# Patient Record
Sex: FEMALE | Race: BLACK OR AFRICAN AMERICAN | NOT HISPANIC OR LATINO | Employment: OTHER | URBAN - METROPOLITAN AREA
[De-identification: names, ages, dates, MRNs, and addresses within clinical notes are randomized per-mention and may not be internally consistent; named-entity substitution may affect disease eponyms.]

---

## 2019-01-27 PROBLEM — R07.9 CHEST PAIN: Status: ACTIVE | Noted: 2019-01-27

## 2019-01-27 PROBLEM — I10 HTN (HYPERTENSION), BENIGN: Status: ACTIVE | Noted: 2019-01-27

## 2019-01-27 PROBLEM — E11.65 UNCONTROLLED TYPE 2 DIABETES MELLITUS WITH HYPERGLYCEMIA: Status: ACTIVE | Noted: 2019-01-27

## 2019-01-28 PROBLEM — E78.5 HYPERLIPIDEMIA: Status: ACTIVE | Noted: 2019-01-28

## 2022-06-10 ENCOUNTER — NURSE TRIAGE (OUTPATIENT)
Dept: ADMINISTRATIVE | Facility: CLINIC | Age: 52
End: 2022-06-10
Payer: MEDICARE

## 2022-06-10 NOTE — TELEPHONE ENCOUNTER
C/o R side headache since yesterday, describes as shooting pain & pressure to R side & R eye, intermittently every few minutes. Pt aaox3. Speaking in full clear sentences, able to answers questions appropriately. +generalized weakness. +visual disturbances. Took BP med & tylenol shortly before call. Has not assessed BP this morning.    Dispo-UC/ER or office with approval now. Advised if unable to schedule or reach outside PCP within 30-40 mins and/or symptoms change worsen, to f/u in UC/ER at that time, also to have someone else transport. Pt vu.         Reason for Disposition   Loss of vision or double vision (Exception: same as prior migraines)    Additional Information   Negative: Difficult to awaken or acting confused (e.g., disoriented, slurred speech)   Negative: Weakness of the face, arm or leg on one side of the body and new-onset   Negative: Numbness of the face, arm or leg on one side of the body and new-onset   Negative: Loss of speech or garbled speech and new-onset   Negative: Passed out (i.e., fainted, collapsed and was not responding)   Negative: Sounds like a life-threatening emergency to the triager   Negative: Unable to walk without falling   Negative: Stiff neck (can't touch chin to chest)   Negative: Possibility of carbon monoxide exposure   Negative: SEVERE headache, states 'worst headache' of life   Negative: SEVERE headache, sudden-onset (i.e., reaching maximum intensity within seconds to 1 hour)   Negative: Severe pain in one eye    Protocols used: HEADACHE-A-OH

## 2025-05-24 ENCOUNTER — TELEPHONE (OUTPATIENT)
Facility: OTHER | Age: 55
End: 2025-05-24
Payer: MEDICARE

## 2025-05-24 ENCOUNTER — NURSE TRIAGE (OUTPATIENT)
Dept: ADMINISTRATIVE | Facility: CLINIC | Age: 55
End: 2025-05-24
Payer: MEDICARE

## 2025-05-24 ENCOUNTER — PATIENT OUTREACH (OUTPATIENT)
Facility: OTHER | Age: 55
End: 2025-05-24
Payer: MEDICARE

## 2025-05-24 ENCOUNTER — OCHSNER VIRTUAL EMERGENCY DEPARTMENT (OUTPATIENT)
Facility: CLINIC | Age: 55
End: 2025-05-24
Payer: MEDICARE

## 2025-05-24 NOTE — PLAN OF CARE-OVED
[facility] Emergency Department Plan of Care Note  Referral Source: Nurse On-Call                               Chief Complaint   Patient presents with    Chest Pain     Chest pain, shortness of breath, lightheadedness, elevated BP     Seen yesterday for same, treated as hypertensive urgency    Recommendation: Emergency Department

## 2025-05-24 NOTE — PROGRESS NOTES
Patient responded to an automated text from [facility] indicating a need for additional assistance following an Emergency Department Visit. The patient was seen in the ED on 5/23/25 for hypertensive urgency. Patient states her BP this morning and it was 153/99 and took the medication. She checked her BP 2 hours later and it is 175/114. She is experiencing lightheadedness, SOB with exertion, and chest tightness.    Harry Provider Dr Rg Chris disposition recommendation patient to go to ED.    Follow up scheduled for 5/25/25 to assess for additional needs.       SALBADOR kirkland LEXII al 13-VAY lent CHALINO leeann gate VAX een  Brand:  Prevnar 13  What is the most important information I should know about this vaccine? For children, the pneumococcal 13-valent vaccine is given in a series of shots. The first shot is usually given when the child is 3 months old. The booster shots are then given at 4 months, 6 months, and 15to 13months of age. Adults usually receive only one dose of the vaccine. In a child older than 6 months who has not yet received this vaccine, the first dose can be given any time from the age of 10 months through 11 years (before the 7th birthday). If the child is less than 3year old at the time of the first shot, he or she will need 2 booster doses. If the child is 15 to 22 months old at the time of the first shot, he or she will need 1 booster dose. A child who is 2 years or older at the time of the first shot may need only the one shot and no booster doses. The timing of this vaccination is very important for it to be effective. Your child's individual booster schedule may be different from these guidelines. Follow your doctor's instructions or the schedule recommended by the health department of the state you live in. Keep track of any and all side effects your child has after receiving this vaccine. When the child receives a booster dose, you will need to tell the doctor if the previous shot caused any side effects. You can still receive a vaccine if you have a minor cold. In the case of a more severe illness with a fever or any type of infection, wait until you get better before receiving this vaccine. Becoming infected with pneumococcal disease (such as pneumonia or meningitis) is much more dangerous to your health than receiving this vaccine. However, like any medicine, this vaccine can cause side effects but the risk of serious side effects is extremely low.   Be sure to keep your child on a regular schedule for other immunizations against diseases such as diphtheria, tetanus, pertussis (whooping cough), measles, mumps, hepatitis, or varicella (chicken pox). Your doctor or state health department can provide you with a recommended immunization schedule. What is pneumococcal 13-valent conjugate vaccine? Pneumococcal disease is a serious infection caused by a bacteria. Pneumococcal bacteria can infect the sinuses and inner ear. It can also infect the lungs, blood, and brain, and these conditions can be fatal.  Pneumococcal 13-valent vaccine is used to prevent infection caused by pneumococcal bacteria. This vaccine contains 13 different types of pneumococcal bacteria. Pneumococcal 13-valent vaccine works by exposing you to a small amount of the bacteria or a protein from the bacteria, which causes the body to develop immunity to the disease. This vaccine will not treat an active infection that has already developed in the body. Pneumococcal 13-valent vaccine is for use in children from 6 weeks to 11years old, and in adults who are 48 and older. Becoming infected with pneumococcal disease (such as pneumonia or meningitis) is much more dangerous to your health than receiving this vaccine. However, like any medicine, this vaccine can cause side effects but the risk of serious side effects is extremely low. Like any vaccine, pneumococcal 13-valent vaccine may not provide protection from disease in every person. What should I discuss with my healthcare provider before receiving this vaccine? Keep track of any and all side effects your child has after receiving this vaccine. When the child receives a booster dose, you will need to tell the doctor if the previous shot caused any side effects. You should not receive this vaccine if you ever had a severe allergic reaction to a pneumococcal or diphtheria vaccine. Before your child receives this vaccine, tell your doctor if the child was born prematurely.   To make sure you or your child can safely receive this vaccine, tell your doctor if you or your child have any of these other conditions:  · a bleeding or blood clotting disorder such as hemophilia or easy bruising; or  · a weak immune system caused by disease, bone marrow transplant, or by using certain medicines or receiving cancer treatments. You can still receive a vaccine if you have a minor cold. In the case of a more severe illness with a fever or any type of infection, wait until you get better before receiving this vaccine. How is this vaccine given? This vaccine is injected into a muscle. You will receive this injection in a doctor's office or clinic setting. For children, the pneumococcal 13-valent vaccine is given in a series of shots. The first shot is usually given when the child is 3 months old. The booster shots are then given at 4 months, 6 months, and 15to 13months of age. Adults usually receive only one dose of the vaccine. The first injection should be given no earlier than 10weeks of age. Allow at least 2 months to pass between injections. If your child is older than 6 months, he or she can still receive this vaccine on the following schedule:  · Age 7-11 months: two injections at least 4 weeks apart, followed by a third injection after the child turns 1 year (at least 2 months after the second injection); · Age 12-23 months: two injections at least 2 months apart;  · Age 19 months to 5 years (before the 7th birthday): one injection. The timing of this vaccination is very important for it to be effective. Your child's individual booster schedule may be different from these guidelines. Follow your doctor's instructions or the schedule recommended by the health department of the state you live in. Your doctor may recommend treating fever and pain with an aspirin-free pain reliever such as acetaminophen (Tylenol) or ibuprofen (Motrin, Advil, and others) when the shot is given and for the next 24 hours. Follow the label directions or your doctor's instructions about how much of this medicine to give your child. It is especially important to prevent fever from occurring in a child who has a seizure disorder such as epilepsy. Be sure to keep your child on a regular schedule for other immunizations such as diphtheria, tetanus, pertussis (whooping cough), hepatitis, and varicella (chicken pox). Your doctor or state health department can provide you with a recommended immunization schedule. What happens if I miss a dose? Contact your doctor if your child will miss a booster dose or gets behind schedule. The next dose should be given as soon as possible. There is no need to start over. Be sure your child receives all recommended doses of this vaccine. If your child does not receive the full series of vaccines, he or she may not be fully protected against the disease. What happens if I overdose? An overdose of this vaccine is unlikely to occur. What should I avoid before or after receiving this vaccine? Follow your doctor's instructions about any restrictions on food, beverages, or activity. What are the possible side effects of this vaccine? Your child should not receive a booster vaccine if he or she had a life-threatening allergic reaction after the first shot. Keep track of any and all side effects your child has after receiving this vaccine. When the child receives a booster dose, you will need to tell the doctor if the previous shot caused any side effects. Get emergency medical help if your child has any of these signs of an allergic reaction: hives; difficulty breathing; swelling of the face, lips, tongue, or throat.   Call your doctor at once if you or your child has a serious side effect such as:  · high fever (103 degrees or higher);  · seizure (convulsions);  · wheezing, trouble breathing;  · severe stomach pain, severe vomiting or diarrhea;  · easy bruising or bleeding; or  · severe pain, itching, irritation, or skin changes where the shot was given. Less serious side effects include  · crying, fussiness;  · headache, tired feeling;  · muscle or joint pain;  · drowsiness, sleeping more or less than usual;  · mild redness, swelling, tenderness, or a hard lump where the shot was given;  · loss of appetite, mild vomiting or diarrhea;  · low fever (102 degrees or less), chills; or  · mild skin rash. This is not a complete list of side effects and others may occur. Call your doctor for medical advice about side effects. You may report vaccine side effects to the [address] [place] [address] [address] and Human Services at 4-106.125.2155. [facility] information has been compiled for use by healthcare practitioners and consumers in the [place] and therefore [facility] does not warrant that uses outside of the [place] are appropriate, unless specifically indicated otherwise. [facility]'s drug information does not endorse drugs, diagnose patients or recommend therapy. [facility]'s drug information is an informational resource designed to assist licensed healthcare practitioners in caring for their patients and/or to serve consumers viewing this service as a supplement to, and not a substitute for, the expertise, skill, knowledge and judgment of healthcare practitioners. The absence of a warning for a given drug or drug combination in no way should be construed to indicate that the drug or drug combination is safe, effective or appropriate for any given patient. [facility] does not assume any responsibility for any aspect of healthcare administered with the aid of information [facility] [place] [address] and Human Services at 0-641.775.9813. [facility] and therefore [facility] does not warrant that uses outside of the [facility] are appropriate, unless specifically indicated otherwise. [facility] drug information does not endorse drugs, diagnose patients or recommend therapy. [facility]'s drug information is an informational resource designed to assist licensed healthcare practitioners in caring for their patients and/or to serve consumers viewing this service as a supplement to, and not a substitute for, the expertise, skill, knowledge and judgment of healthcare practitioners. The absence of a warning for a given drug or drug combination in no way should be construed to indicate that the drug or drug combination is safe, effective or appropriate for any given patient. [facility] does not assume any responsibility for any aspect of healthcare administered with the aid of information [facility] [facility] (Santa Rosa melvin, fear, and surprise.  Your baby may be much more social and may laugh and smile at other people. At this age, your baby may be ready to roll over and hold on to toys. They may , smile, laugh, and squeal. By the third or fourth month, many babies can sleep up to 7 or 8 hours during the night and develop set nap times. Follow-up care is a key part of your child's treatment and safety. Be sure to make and go to all appointments, and call your doctor if your child is having problems. It's also a good idea to know your child's test results and keep a list of the medicines your child takes. How can you care for your child at home? Feeding  · If you breastfeed, let your baby decide when and how long to nurse. · If you do not breastfeed, use a formula with iron. · Do not give your baby honey in the first year of life. Honey can make your baby sick. · You may begin to give solid foods when your baby is about 10 months old. Some babies may be ready for solid foods at 4 or 5 months. Ask your doctor when you can start feeding your baby solid foods. At first, give foods that are smooth, easy to digest, and part fluid, such as rice cereal.  · Use a baby spoon or a small spoon to feed your baby. Begin with one or two teaspoons of cereal mixed with breast milk or lukewarm formula. Your baby's stools will become firmer after starting solid foods. · Keep feeding breast milk or formula while your baby starts eating solid foods. Parenting  · Read books to your baby daily. · If your baby is teething, it may help to gently rub the gums or use teething rings. · Put your baby on their stomach when awake to help strengthen the neck and arms. · Give your baby brightly colored toys to hold and look at. Immunizations  · Most babies get the second dose of important vaccines at their 4-month checkup. Make sure that your baby gets the recommended childhood vaccines for illnesses, such as whooping cough and diphtheria.  These vaccines will help keep your baby healthy and prevent the spread of disease. Your baby needs all doses to be protected. When should you call for help? Watch closely for changes in your child's health, and be sure to contact your doctor if:    · You are concerned that your child is not growing or developing normally.     · You are worried about your child's behavior.     · You need more information about how to care for your child, or you have questions or concerns. Where can you learn more? Go to https://[employer]. org and sign in to your [employer] account. Enter  in the [employer] [facility] ([facility]). 

## 2025-05-24 NOTE — TELEPHONE ENCOUNTER
Call taken from [employer].    Maday c/o high blood pressure, severe headache unrelieved by Tylenol and severe left eye pain. Symptoms are new since ED visit today. Current /107. Pt reports went to ED yesterday and today for high blood pressure. Pt also reports intermittent episodes of SOB but states SOB is not present now. Advised Maday per triage protocol to go to nearest ED now for physician [place]. Instructed to call  now if no immediate . V/u.     Reason for Disposition   Severe pain in one eye    Additional Information   Negative: Difficult to awaken or acting confused (e.g., disoriented, slurred speech)   Negative: SEVERE difficulty breathing (e.g., struggling for each breath, speaks in single words)   Negative: [1] Weakness of the face, arm or leg on one side of the body AND [2] new-onset   Negative: [1] Numbness (i.e., loss of sensation) of the face, arm or leg on one side of the body AND [2] new-onset   Negative: [1] Chest pain lasts > 5 minutes AND [2] history of heart disease (i.e., heart attack, bypass surgery, angina, angioplasty, CHF)   Negative: [1] Chest pain AND [2] took nitroglycerin AND [3] pain was not relieved   Negative: Sounds like a life-threatening emergency to the triager   Symptom is main concern (e.g., headache, chest pain)   Negative: Difficult to awaken or acting confused (e.g., disoriented, slurred speech)   Negative: [1] Weakness of the face, arm or leg on one side of the body AND [2] new-onset   Negative: [1] Numbness of the face, arm or leg on one side of the body AND [2] new-onset   Negative: [1] Loss of speech or garbled speech AND [2] new-onset   Negative: Passed out (e.g., fainted, lost consciousness, blacked out and was not responding)   Negative: Sounds like a life-threatening emergency to the triager   Negative: Followed a head injury   Negative: Unable to walk, or can only walk with assistance (e.g., requires support)   Negative: Stiff neck (can't touch  chin to chest)    Protocols used: Blood Pressure - High-A-AH, Headache-A-AH

## 2025-05-24 NOTE — PROGRESS NOTES
Patient responded to an automated text from [facility] indicating a need for additional assistance following an Emergency Department Visit. The patient was seen in the ED on 5/23/25 for hypertension. The ED AVS instructs the patient to follow up with PCP. Contacted patient to assess for needs. Patient states that was prescribed metoprolol 50mg. She checked her BP this morning and it was 153/99 and took the medication. She checked her BP 2 hours later and it is 175/114. Today she has eaten crackers and smoked sausage with mustard. She drank 2 bottles of water today. She also states that she is having lightheadedness, mild chest discomfort, and SOB with exertion. Call escalated to [facility]. Dr Rg Chris disposition is for patient to return to the ED. Patient agrees with plan and disconnected the call.   Patient provided with [facility] 24/7 call line phone number [phone]. Encounter closed.

## 2025-05-26 ENCOUNTER — PATIENT OUTREACH (OUTPATIENT)
Facility: OTHER | Age: 55
End: 2025-05-26
Payer: MEDICARE

## 2025-05-26 NOTE — PROGRESS NOTES
Patient was seen in the Emergency Department on 5/24/25. The After Visit Summary instructed patient to follow up with Cardiology. A follow-up call was made to assess for additional needs. Patient stated she is currently in the Emergency Department at [facility], still experiencing elevated blood pressure and chest pain. Patient was advised that she would receive a follow-up call in a couple of days to assess for needs.

## 2025-06-01 ENCOUNTER — PATIENT OUTREACH (OUTPATIENT)
Facility: OTHER | Age: 55
End: 2025-06-01
Payer: MEDICARE

## 2025-06-13 ENCOUNTER — PATIENT OUTREACH (OUTPATIENT)
Facility: OTHER | Age: 55
End: 2025-06-13
Payer: MEDICARE

## 2025-06-13 NOTE — PROGRESS NOTES
Patient was seen in the ED on 3 separate occasions in May, and following one visit, [facility] provider Rg Chris was consulted.  Following ED discharge, patient was instructed to follow up with her PCP and Cardiology.  Spoke with patient on the telephone for follow up, and patient reported she is doing better with her BP lowered.  Patient stated she has an appointment with a non-[facility] PCP on 06/26/25 and an appointment with Cardiology later this month.  Patient denied any additional needs or concerns.  She was agreeable to me calling her after PCP appointment to assess for additional needs or concerns.     Follow up scheduled 6/27/25.    Aleyda Rivera  ED Navigator